# Patient Record
Sex: FEMALE | Race: WHITE | NOT HISPANIC OR LATINO | Employment: UNEMPLOYED | URBAN - METROPOLITAN AREA
[De-identification: names, ages, dates, MRNs, and addresses within clinical notes are randomized per-mention and may not be internally consistent; named-entity substitution may affect disease eponyms.]

---

## 2021-10-20 PROBLEM — F41.8 ANXIETY WITH DEPRESSION: Status: ACTIVE | Noted: 2021-10-20

## 2021-12-08 PROBLEM — F43.10 PTSD (POST-TRAUMATIC STRESS DISORDER): Status: ACTIVE | Noted: 2021-12-08

## 2021-12-08 PROBLEM — F31.81 BIPOLAR 2 DISORDER: Status: ACTIVE | Noted: 2021-10-20

## 2022-05-05 PROBLEM — F31.9 BIPOLAR DISORDER: Status: ACTIVE | Noted: 2021-10-20

## 2022-05-05 PROBLEM — F41.9 ANXIETY: Status: ACTIVE | Noted: 2021-12-08

## 2022-05-05 PROBLEM — J31.0 CHRONIC RHINITIS: Status: ACTIVE | Noted: 2022-05-05

## 2022-05-05 PROBLEM — E55.9 VITAMIN D DEFICIENCY: Status: ACTIVE | Noted: 2022-05-05

## 2022-07-01 PROBLEM — F43.10 PTSD (POST-TRAUMATIC STRESS DISORDER): Status: ACTIVE | Noted: 2022-07-01

## 2022-07-05 PROBLEM — F12.10 CANNABIS USE DISORDER, MILD, ABUSE: Status: ACTIVE | Noted: 2022-07-05

## 2023-02-02 ENCOUNTER — SOCIAL WORK (OUTPATIENT)
Dept: ADMINISTRATIVE | Facility: OTHER | Age: 23
End: 2023-02-02

## 2023-02-02 ENCOUNTER — PATIENT MESSAGE (OUTPATIENT)
Dept: ADMINISTRATIVE | Facility: OTHER | Age: 23
End: 2023-02-02

## 2023-02-02 NOTE — PROGRESS NOTES
Sw received a consult to assist with counseling resources. Patient's insurance has changed and she is looking for a new therapist. Sw called and spoke to Patient ([phone]). She explained she now has insurance through her work. She wants to see a therapist in the private sector. Sw emailed Patient the names of some counselors through the [facility], per her request. Sw encouraged Patient to contact one to schedule an appointment.    [facility]   [phone]  Qi Obrien, [facility]   [phone]  Pratibha Lopez, [facility]   [phone]  Vonnie Rubi, [facility], [facility]   [phone]  Marcelina Winters, [facility]   [phone]  Maddie Ospina, [facility]   [phone]    Daisy Isaacs, [facility]    [phone]